# Patient Record
Sex: MALE | Race: WHITE | URBAN - METROPOLITAN AREA
[De-identification: names, ages, dates, MRNs, and addresses within clinical notes are randomized per-mention and may not be internally consistent; named-entity substitution may affect disease eponyms.]

---

## 2017-04-17 ENCOUNTER — OFFICE VISIT (OUTPATIENT)
Dept: FAMILY MEDICINE | Facility: CLINIC | Age: 32
End: 2017-04-17
Payer: COMMERCIAL

## 2017-04-17 VITALS
RESPIRATION RATE: 16 BRPM | BODY MASS INDEX: 30.61 KG/M2 | OXYGEN SATURATION: 99 % | HEART RATE: 72 BPM | TEMPERATURE: 98.7 F | DIASTOLIC BLOOD PRESSURE: 74 MMHG | WEIGHT: 238.5 LBS | SYSTOLIC BLOOD PRESSURE: 116 MMHG | HEIGHT: 74 IN

## 2017-04-17 DIAGNOSIS — H61.22 IMPACTED CERUMEN OF LEFT EAR: Primary | ICD-10-CM

## 2017-04-17 DIAGNOSIS — Z23 NEED FOR VACCINATION: ICD-10-CM

## 2017-04-17 PROCEDURE — 90471 IMMUNIZATION ADMIN: CPT | Performed by: PHYSICIAN ASSISTANT

## 2017-04-17 PROCEDURE — 99202 OFFICE O/P NEW SF 15 MIN: CPT | Mod: 25 | Performed by: PHYSICIAN ASSISTANT

## 2017-04-17 PROCEDURE — 69210 REMOVE IMPACTED EAR WAX UNI: CPT | Performed by: PHYSICIAN ASSISTANT

## 2017-04-17 PROCEDURE — 90715 TDAP VACCINE 7 YRS/> IM: CPT | Performed by: PHYSICIAN ASSISTANT

## 2017-04-17 NOTE — PROGRESS NOTES
Screening Questionnaire for Adult Immunization    Are you sick today?   No   Do you have allergies to medications, food, a vaccine component or latex?   No   Have you ever had a serious reaction after receiving a vaccination?   No   Do you have a long-term health problem with heart disease, lung disease, asthma, kidney disease, metabolic disease (e.g. diabetes), anemia, or other blood disorder?   No   Do you have cancer, leukemia, HIV/AIDS, or any other immune system problem?   No   In the past 3 months, have you taken medications that affect  your immune system, such as prednisone, other steroids, or anticancer drugs; drugs for the treatment of rheumatoid arthritis, Crohn s disease, or psoriasis; or have you had radiation treatments?   No   Have you had a seizure, or a brain or other nervous system problem?   No   During the past year, have you received a transfusion of blood or blood     products, or been given immune (gamma) globulin or antiviral drug?   No   For women: Are you pregnant or is there a chance you could become        pregnant during the next month?   No   Have you received any vaccinations in the past 4 weeks?   No     Immunization questionnaire answers were all negative.      MNVFC doesn't apply on this patient    Per orders of Dr. Hughes, injection of Tdap given by Gracia Coe. Patient instructed to remain in clinic for 20 minutes afterwards, and to report any adverse reaction to me immediately.       Screening performed by Gracia Coe on 4/17/2017 at 2:25 PM.

## 2017-04-17 NOTE — NURSING NOTE
"Chief Complaint   Patient presents with     Ear Problem       Initial /74 (BP Location: Left arm, Cuff Size: Adult Large)  Pulse 72  Temp 98.7  F (37.1  C) (Tympanic)  Resp 16  Ht 6' 2\" (1.88 m)  Wt 238 lb 8 oz (108.2 kg)  SpO2 99%  BMI 30.62 kg/m2 Estimated body mass index is 30.62 kg/(m^2) as calculated from the following:    Height as of this encounter: 6' 2\" (1.88 m).    Weight as of this encounter: 238 lb 8 oz (108.2 kg).  Medication Reconciliation: complete   Gracia Coe CMA    "

## 2017-04-17 NOTE — MR AVS SNAPSHOT
"              After Visit Summary   2017    Raúl Arboleda    MRN: [id number]           Patient Information     Date Of Birth          1985        Visit Information        Provider Department      2017 1:30 PM Deonna Hughes PA-C [facility]        Today's Diagnoses     Impacted cerumen of left ear    -  1    Need for vaccination           Follow-ups after your visit        Who to contact     If you have questions or need follow up information about today's clinic visit or your schedule please contact [facility] directly at [phone].  Normal or non-critical lab and imaging results will be communicated to you by [employer], letter or phone within 4 business days after the clinic has received the results. If you do not hear from us within 7 days, please contact the clinic through [employer] or phone. If you have a critical or abnormal lab result, we will notify you by phone as soon as possible.  Submit refill requests through [employer] or call your pharmacy and they will forward the refill request to us. Please allow 3 business days for your refill to be completed.          Additional Information About Your Visit        MyChart Information     [employer]armMD lets you send messages to your doctor, view your test results, renew your prescriptions, schedule appointments and more. To sign up, go to [email or web address] . Click on \"Log in\" on the left side of the screen, which will take you to the Welcome page. Then click on \"Sign up Now\" on the right side of the page.     You will be asked to enter the access code listed below, as well as some personal information. Please follow the directions to create your username and password.     Your access code is: [email or web address]  Expires: 2017  2:28 PM     Your access code will  in 90 days. If you need help or a new code, please call your [facility] or [phone].        Care EveryWhere ID     " "This is your Care EveryWhere ID. This could be used by other organizations to access your [place] medical records  [id number]        Your Vitals Were     Pulse Temperature Respirations Height Pulse Oximetry BMI (Body Mass Index)    72 98.7  F (37.1  C) (Tympanic) 16 6' 2\" (1.88 m) 99% 30.62 kg/m2       Blood Pressure from Last 3 Encounters:   04/17/17 116/74   10/10/06 96/66   10/09/06 90/66    Weight from Last 3 Encounters:   04/17/17 238 lb 8 oz (108.2 kg)   10/10/06 188 lb (85.3 kg)   10/09/06 188 lb 12.8 oz (85.6 kg)              We Performed the Following     REMOVE IMPACTED CERUMEN     TDAP VACCINE (ADACEL)          Today's Medication Changes          These changes are accurate as of: 4/17/17  3:46 PM.  If you have any questions, ask your nurse or doctor.               Stop taking these medicines if you haven't already. Please contact your care team if you have questions.     NO ACTIVE MEDICATIONS   Stopped by:  Deonna Hughes PA-C           SILVADENE 1 % cream   Generic drug:  silver sulfADIAZINE   Stopped by:  Deonna Hughes PA-C                    Primary Care Provider    None Specified       No primary provider on file.        Thank you!     Thank you for choosing [facility]  for your care. Our goal is always to provide you with excellent care. Hearing back from our patients is one way we can continue to improve our services. Please take a few minutes to complete the written survey that you may receive in the mail after your visit with us. Thank you!             Your Updated Medication List - Protect others around you: Learn how to safely use, store and throw away your medicines at www.disposemymeds.org.      Notice  As of 4/17/2017  3:46 PM    You have not been prescribed any medications.      "

## 2017-04-17 NOTE — PROGRESS NOTES
"  SUBJECTIVE:                                                    Raúl Arboleda is a 31 year old male who presents to clinic today for the following health issues:  Health Maintenance Due   Topic Date Due     TETANUS IMMUNIZATION (SYSTEM ASSIGNED)  06/19/2003     Health Maintenance reviewed at today's visit patient asked to schedule/complete:   Immunizations:  Patient agrees to schedule    Ears plugged      Duration: 24 hours    Description (location/character/radiation): left ear    Intensity:  moderate    Accompanying signs and symptoms: feels sloshing in ear    History (similar episodes/previous evaluation): None    Precipitating or alleviating factors: water in ears    Therapies tried and outcome: tried drying out ear with q tip, and tried ear wax drops       HPI additional notes:  Chief Complaint   Patient presents with     Ear Problem     Imm/Inj     Raúl presents today with Lt ear pressure. Patient took bath 2 days ago, ear felt full but \"clear.\" Tried to use Qtip to \"dry up\" ear yesterday but made symptoms worse. Reports persistent pressure and muffled hearing on affected side. Tried OTC ear wax drops yesterday w/o relief.     ROS:  Skin: negative  Eyes: negative  Ears/Nose/Throat: as above  Respiratory: No shortness of breath, dyspnea on exertion, cough, or hemoptysis  Cardiovascular: negative  Gastrointestinal: negative  Genitourinary: negative  Musculoskeletal: negative  Neurologic: negative  Psychiatric: negative  Hematologic/Lymphatic/Immunologic: negative  Endocrine: negative    Chart Review:  History   Smoking Status     Former Smoker   Smokeless Tobacco     Not on file     Comment: x 11 days       Patient Active Problem List   Diagnosis   (none) - all problems resolved or deleted     Past Surgical History:   Procedure Laterality Date     NO HISTORY OF SURGERY       Problem list, Medication list, Allergies, Medical/Social/Surg hx reviewed in AddressReport, updated as appropriate.   OBJECTIVE:                 " "                                   /74 (BP Location: Left arm, Cuff Size: Adult Large)  Pulse 72  Temp 98.7  F (37.1  C) (Tympanic)  Resp 16  Ht 6' 2\" (1.88 m)  Wt 238 lb 8 oz (108.2 kg)  SpO2 99%  BMI 30.62 kg/m2  Body mass index is 30.62 kg/(m^2).  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 Rt ear canal normal; TM pearly gray without effusion. Mucus membranes moist.  NECK: Non-tender, no adenopathy.  SKIN: no suspicious lesions, no rashes  PSYCH: Able to articulate logical thoughts. Affect is normal.  NEURO: alert, sensation grossly intact    Cerumen is noted via otoscopic examination in the left external ear causing severe obstruction with impaction.  Removal deemed medically necessary due loss of hearing secondary to obstruction.  Cerumen was removed by provider with warm water irrigation and manual debridement with wax curette. Total procedure time 2 minutes. Pt tolerated procedure well without complication. Repeat Lt ear exam: canal normal, TM pearly gray w/o effusion.    Diagnostic test results: none     ASSESSMENT/PLAN:                                                          ICD-10-CM    1. Impacted cerumen of left ear H61.22 REMOVE IMPACTED CERUMEN   2. Need for vaccination Z23 TDAP VACCINE (ADACEL)     Ear wax removal successful. Discussed methods for home removal and proper ear care. Patient likely prone to ear wax buildup due to tavia hematoma and narrow meatus.    Tetanus updated today.    Please see patient instructions for treatment details.    Follow up in 7-10 days if not improving as anticipated, sooner PRN.    Deonna Hughes PA-C  [facility]     "